# Patient Record
Sex: MALE | Race: WHITE
[De-identification: names, ages, dates, MRNs, and addresses within clinical notes are randomized per-mention and may not be internally consistent; named-entity substitution may affect disease eponyms.]

---

## 2018-08-13 ENCOUNTER — HOSPITAL ENCOUNTER (EMERGENCY)
Dept: HOSPITAL 61 - ER | Age: 23
Discharge: HOME | End: 2018-08-13
Payer: SELF-PAY

## 2018-08-13 VITALS — WEIGHT: 204 LBS | HEIGHT: 75 IN | BODY MASS INDEX: 25.36 KG/M2

## 2018-08-13 VITALS — DIASTOLIC BLOOD PRESSURE: 69 MMHG | SYSTOLIC BLOOD PRESSURE: 143 MMHG

## 2018-08-13 DIAGNOSIS — Z90.89: ICD-10-CM

## 2018-08-13 DIAGNOSIS — L03.312: Primary | ICD-10-CM

## 2018-08-13 PROCEDURE — 99283 EMERGENCY DEPT VISIT LOW MDM: CPT

## 2018-08-13 NOTE — PHYS DOC
Past Medical History


Past Medical History:  No Pertinent History


Past Surgical History:  Appendectomy, Other


Additional Past Surgical Histo:  RIGHT KNEE REPAIR


Alcohol Use:  Occasionally


Drug Use:  None





Adult General


Chief Complaint


Chief Complaint:  SKIN PROBLEM





[facility]





Patient is a 23  year old male who presents with a red, painful skin irritation 

to his right lower back. The patient denies any known injury. He states that he 

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 He denies fever, 

nausea or vomiting.





Review of Systems


Review of Systems





Constitutional: Denies fever or chills []


Respiratory: Denies cough or shortness of breath []


Cardiovascular: No additional information not addressed in HPI []


Musculoskeletal: Denies back pain or joint pain []


Integument: See history of present illness


Neurologic: Denies headache, focal weakness or sensory changes []


Endocrine: Denies polyuria or polydipsia []





All other systems were reviewed and found to be within normal limits, except as 

documented in this note.





Allergies


Allergies





Allergies








Coded Allergies Type Severity Reaction Last Updated Verified


 


  No Known Drug Allergies    8/13/18 No











Physical Exam


Physical Exam





Constitutional: Well developed, well nourished, no acute distress, non-toxic 

appearance. []


Cardiovascular:Heart rate regular rhythm, no murmur []


Lungs & Thorax:  Bilateral breath sounds clear to auscultation []


Skin: There is a 4 cm x 4 cm erythematous and indurated patch to the patient's 

right lower back with no sign of fluctuation, calor noted


Neurologic: Alert and oriented X 3, normal motor function, normal sensory 

function, no focal deficits noted. []


Psychologic: Affect normal, judgement normal, mood normal. []





Current Patient Data


Vital Signs





 Vital Signs








  Date Time  Temp Pulse Resp B/P (MAP) Pulse Ox O2 Delivery O2 Flow Rate FiO2


 


8/13/18 21:25 99.4 97 16 143/69 (93) 95 Room Air  





 99.4       











EKG


EKG


[]





Radiology/Procedures


Radiology/Procedures


[]





Course & Med Decision Making


Course & Med Decision Making


Pertinent Labs and Imaging studies reviewed. (See chart for details)





[]





Dragon Disclaimer


Dragon Disclaimer


This electronic medical record was generated, in whole or in part, using a 

voice recognition dictation system.





Departure


Departure


Impression:  


 Primary Impression:  


 Cellulitis


Disposition:  01 HOME, SELF-CARE


Condition:  STABLE


Referrals:  


NO PCP (PCP)


Patient Instructions:  Cellulitis





Additional Instructions:  


Take the medication as prescribed. Follow-up with your primary care provider in 

3 days for recheck or return to the emergency department if worsening. You may 

use cold or hot compresses for comfort.


Scripts


Cephalexin (KEFLEX) 500 Mg Capsule


1 CAP PO TID, #30 CAP


   Prov: MATTY GIBBONS         8/13/18 


Methylprednisolone (MEDROL) 4 Mg Tab.ds.pk


1 PKG PO UD, #1 PKG


   Prov: MATTY GIBBONS         8/13/18











MATTY GIBBONS Aug 13, 2018 22:07

## 2018-10-24 ENCOUNTER — HOSPITAL ENCOUNTER (EMERGENCY)
Dept: HOSPITAL 61 - ER | Age: 23
Discharge: HOME | End: 2018-10-24
Payer: SELF-PAY

## 2018-10-24 VITALS — WEIGHT: 204 LBS | BODY MASS INDEX: 25.36 KG/M2 | HEIGHT: 75 IN

## 2018-10-24 VITALS — SYSTOLIC BLOOD PRESSURE: 134 MMHG | DIASTOLIC BLOOD PRESSURE: 83 MMHG

## 2018-10-24 DIAGNOSIS — Z90.89: ICD-10-CM

## 2018-10-24 DIAGNOSIS — G89.29: ICD-10-CM

## 2018-10-24 DIAGNOSIS — M54.5: Primary | ICD-10-CM

## 2018-10-24 PROCEDURE — 96372 THER/PROPH/DIAG INJ SC/IM: CPT

## 2018-10-24 PROCEDURE — 99283 EMERGENCY DEPT VISIT LOW MDM: CPT
